# Patient Record
Sex: MALE | Race: WHITE | NOT HISPANIC OR LATINO | Employment: OTHER | URBAN - METROPOLITAN AREA
[De-identification: names, ages, dates, MRNs, and addresses within clinical notes are randomized per-mention and may not be internally consistent; named-entity substitution may affect disease eponyms.]

---

## 2017-10-10 ENCOUNTER — OFFICE VISIT (OUTPATIENT)
Dept: INTERNAL MEDICINE | Facility: CLINIC | Age: 67
End: 2017-10-10

## 2017-10-10 VITALS
SYSTOLIC BLOOD PRESSURE: 132 MMHG | DIASTOLIC BLOOD PRESSURE: 88 MMHG | WEIGHT: 197 LBS | HEART RATE: 80 BPM | BODY MASS INDEX: 30.85 KG/M2

## 2017-10-10 DIAGNOSIS — M25.50 MULTIPLE JOINT PAIN: ICD-10-CM

## 2017-10-10 DIAGNOSIS — E78.5 HYPERLIPIDEMIA, UNSPECIFIED HYPERLIPIDEMIA TYPE: ICD-10-CM

## 2017-10-10 DIAGNOSIS — R09.89 LABILE HYPERTENSION: ICD-10-CM

## 2017-10-10 DIAGNOSIS — H61.23 BILATERAL IMPACTED CERUMEN: ICD-10-CM

## 2017-10-10 DIAGNOSIS — Z00.00 HEALTH CARE MAINTENANCE: Primary | ICD-10-CM

## 2017-10-10 DIAGNOSIS — K21.9 GASTROESOPHAGEAL REFLUX DISEASE WITHOUT ESOPHAGITIS: ICD-10-CM

## 2017-10-10 DIAGNOSIS — Z23 NEED FOR PROPHYLACTIC VACCINATION AND INOCULATION AGAINST INFLUENZA: ICD-10-CM

## 2017-10-10 RX ORDER — ATORVASTATIN CALCIUM 20 MG/1
20 TABLET, FILM COATED ORAL DAILY
Qty: 90 TABLET | Refills: 3 | Status: SHIPPED | OUTPATIENT
Start: 2017-10-10

## 2017-10-10 RX ORDER — PROPRANOLOL HYDROCHLORIDE 60 MG/1
60 TABLET ORAL 2 TIMES DAILY
Qty: 180 TABLET | Refills: 3 | Status: SHIPPED | OUTPATIENT
Start: 2017-10-10

## 2017-10-10 RX ORDER — NAPROXEN 500 MG/1
500 TABLET ORAL 2 TIMES DAILY WITH MEALS
Qty: 180 TABLET | Refills: 3 | Status: SHIPPED | OUTPATIENT
Start: 2017-10-10

## 2017-10-10 ASSESSMENT — PAIN SCALES - GENERAL: PAINLEVEL: NO PAIN (0)

## 2017-10-10 NOTE — PROGRESS NOTES
[facility]   Primary Care Clinic      Resident Clinic Note        Visit Date: October 10, 2017    Jerrod Deal  MRN: [id number]  YOB: 1950    Chief Complaint: health maintenance, medication check      HPI:  Jerrod Deal is a 66 year old male with past medical history of migraine headaches, knee pain, s/p arthroplasty in 2016, GERD who comes in for routine health maintenance evaluation and medication refills.      He has no new complaints today aside from wax build up in both ears.  Has been taking his medications as directed. Typically uses Naproxen for knee pain 5/7 days and takes nexium daily.  Denies any worsening knee pain or acid reflux.  No recent fevers, chills, weight loss. Otherwise feels well.      Past medical, surgical, family history were reviewed in the chart.      ROS:  10 point ROS was reviewed and negative other than stated in HPI    Medications and Allergies were reviewed in epic and updated as indicated.        Vitals:  /88  Pulse 80  Wt 89.4 kg (197 lb)  BMI 30.85 kg/m2    Vitals:    10/10/17 0802   Weight: 89.4 kg (197 lb)       Physical Exam:  General: NAD  HEENT: Oral mucosa moist and non-erythematous, PERRLA, EOM intact. TM obscured by earwax bilaterally.    CV: RRR, normal S1S2, no m/c/r  Resp: Clear to auscultation bilaterally, no wheezes or crackles  Abd: Soft, non-tender, BS+, no masses appreciated  Extremities: WWP, no pedal edema  Neuro: AAOx3, no lateralizing symptoms or focal neurologic deficits  Psych: Normal mood and affect.      Assessment:  Jerrod Deal is a 66 year old who presents today for routine health maintenance and for medication refills.    Plan:    Jerrod was seen today for refill request.    Diagnoses and all orders for this visit:    Health care maintenance  -     FLU VACCINE, 3 YRS +, IM  [[id number]]    Migraine headaches/HTN  -     propranolol HCl 60 MG TABS; Take 1 tablet (60 mg) by mouth 2 times  daily    Gastroesophageal reflux disease without esophagitis - continue Nexium.      Multiple joint pain  -     naproxen (NAPROSYN) 500 MG tablet; Take 1 tablet (500 mg) by mouth 2 times daily (with meals)    Cerumen impaction   - Ears irrigated today in clinic with good results.      Hyperlipidemia, unspecified hyperlipidemia type  -     atorvastatin (LIPITOR) 20 MG tablet; Take 1 tablet (20 mg) by mouth daily    Health Maintenance:  Blood pressure check: Within normal limits.     Immunizations: Flu vaccine updated today.      Follow-up:  For health maintenance in 6 months to 1 year.      Patient discussed with Dr. Kahn who agrees with the assessment and plan above.      Prasanna Greene MD,   Internal Medicine PGY-3  [phone]         Teaching Physician Note:    While the patient was in clinic, I reviewed the patient's medical history and results, the resident's findings on physical examination, and the patient's diagnosis and treatment plan with the resident.  I agree with the information documented with the following exceptions: none.    Marianne Kahn M.D.  Internal Medicine  Primary Care Center   pager [phone]

## 2017-10-10 NOTE — MR AVS SNAPSHOT
After Visit Summary   10/10/2017    Jerrod Deal    MRN: [id number]           Patient Information     Date Of Birth          1950        Visit Information        Provider Department      10/10/2017 8:15 AM Prasanna Greene MD [facility]        Today's Diagnoses     Need for prophylactic vaccination and inoculation against influenza    -  1    Labile hypertension        Gastroesophageal reflux disease without esophagitis        Multiple joint pain        Hyperlipidemia, unspecified hyperlipidemia type           Follow-ups after your visit        Who to contact     Please call your clinic at [phone] to:    Ask questions about your health    Make or cancel appointments    Discuss your medicines    Learn about your test results    Speak to your doctor   If you have compliments or concerns about an experience at your clinic, or if you wish to file a complaint, please contact [facility] Patient Relations at [phone] or email us at [email or web address]         Additional Information About Your Visit        MyChart Information     M[employer]t is an electronic gateway that provides easy, online access to your medical records. With [employer], you can request a clinic appointment, read your test results, renew a prescription or communicate with your care team.     To sign up for MineWhatt visit the website at [email or web address]   You will be asked to enter the access code listed below, as well as some personal information. Please follow the directions to create your username and password.     Your access code is: [email or web address]  Expires: 2018  6:31 AM     Your access code will  in 90 days. If you need help or a new code, please contact your [facility] or call [phone] for assistance.        Care EveryWhere ID     This is your Care EveryWhere ID. This could be used by other organizations to access  your [facility] medical records  [id number]        Your Vitals Were     Pulse BMI (Body Mass Index)                80 30.85 kg/m2           Blood Pressure from Last 3 Encounters:   10/10/17 132/88   05/18/16 134/83   06/24/15 128/89    Weight from Last 3 Encounters:   10/10/17 89.4 kg (197 lb)   05/18/16 88.7 kg (195 lb 8 oz)   06/24/15 82.6 kg (182 lb)              We Performed the Following     FLU VACCINE, 3 YRS +, IM  [[id number]]          Today's Medication Changes          These changes are accurate as of: 10/10/17  8:30 AM.  If you have any questions, ask your nurse or doctor.               These medicines have changed or have updated prescriptions.        Dose/Directions    atorvastatin 20 MG tablet   Commonly known as:  LIPITOR   This may have changed:    - medication strength  - how much to take   Used for:  Hyperlipidemia, unspecified hyperlipidemia type   Changed by:  Prasanna Greene MD        Dose:  20 mg   Take 1 tablet (20 mg) by mouth daily   Quantity:  90 tablet   Refills:  3            Where to get your medicines      [facility] [id number] [place] [address] - [address] AT [facility] [address], [facility] [address]    Hours:  called pharm.  they do accept faxes Phone:  [phone]     atorvastatin 20 MG tablet    naproxen 500 MG tablet    propranolol HCl 60 MG Tabs                Primary Care Provider Office Phone # Fax #    Phani Adkins [phone]       [address] [place] [address]        Equal Access to Services     [facility] PEYMAN AH: Hadii darryl casiano hadashastrid Soemilia, waaxda luqadaha, qaybta kaalmada robert clark. So [facility] [phone].    ATENCIÓN: Si habla español, tiene a chapman disposición servicios gratuitos de asistencia lingüística. [facility] [phone].    We comply with applicable federal civil rights laws and [place]  laws. We do not discriminate on the basis of race, color, national origin, age, disability, sex, sexual orientation, or gender identity.            Thank you!     Thank you for choosing [facility]  for your care. Our goal is always to provide you with excellent care. Hearing back from our patients is one way we can continue to improve our services. Please take a few minutes to complete the written survey that you may receive in the mail after your visit with us. Thank you!             Your Updated Medication List - Protect others around you: Learn how to safely use, store and throw away your medicines at www.disposemymeds.org.          This list is accurate as of: 10/10/17  8:30 AM.  Always use your most recent med list.                   Brand Name Dispense Instructions for use Diagnosis    aspirin 81 MG tablet      Take 1 tablet by mouth daily.        atorvastatin 20 MG tablet    LIPITOR    90 tablet    Take 1 tablet (20 mg) by mouth daily    Hyperlipidemia, unspecified hyperlipidemia type       esomeprazole 20 MG CR capsule    nexIUM     Take 1 capsule (20 mg) by mouth every morning (before breakfast) Take 30-60 minutes before eating.    Gastroesophageal reflux disease without esophagitis       naproxen 500 MG tablet    NAPROSYN    180 tablet    Take 1 tablet (500 mg) by mouth 2 times daily (with meals)    Multiple joint pain       propranolol HCl 60 MG Tabs     180 tablet    Take 1 tablet (60 mg) by mouth 2 times daily    Labile hypertension

## 2017-10-10 NOTE — NURSING NOTE
Patient tolerated ear wash well.  Large amount of impacted cerumen removed from both ear/ears.  Tympanic membrane visible.  Cristiana Contreras LPN at 10:38 AM on 10/10/2017.

## 2017-10-10 NOTE — NURSING NOTE
"Chief Complaint   Patient presents with     Refill Request     Patient here for medication refills.     Cristiana PortilloJOSEMANUEL lara at 8:02 AM on 10/10/2017.    Injectable Influenza Immunization Documentation    1.  Has the patient received the information for the injectable influenza vaccine? YES     2. Is the patient 6 months of age or older? YES     3. Does the patient have any of the following contraindications?         Severe allergy to eggs? No     Severe allergic reaction to previous influenza vaccines? No   Severe allergy to latex? No       History of Guillain-Palmyra syndrome? No     Currently have a temperature greater than 100.4F? No        4.  Severely egg allergic patients should have flu vaccine eligibility assessed by an MD, RN, or pharmacist, and those who received flu vaccine should be observed for 15 min by an MD, RN, Pharmacist, Medical Technician, or member of clinic staff.\": YES    5. Latex-allergic patients should be given latex-free influenza vaccine N/A. Please reference the Vaccine latex table to determine if your clinic s product is latex-containing.               "

## 2018-07-30 ENCOUNTER — TRANSFERRED RECORDS (OUTPATIENT)
Dept: HEALTH INFORMATION MANAGEMENT | Facility: CLINIC | Age: 68
End: 2018-07-30

## 2019-03-14 DIAGNOSIS — M25.50 MULTIPLE JOINT PAIN: ICD-10-CM

## 2019-03-14 RX ORDER — NAPROXEN 500 MG/1
500 TABLET ORAL 2 TIMES DAILY WITH MEALS
Qty: 180 TABLET | Refills: 3 | Status: CANCELLED | OUTPATIENT
Start: 2019-03-14

## 2019-03-15 NOTE — TELEPHONE ENCOUNTER
Patient has not been seen here since 2014 - refill refused and pharmacy notified.      Kathleen M Doege RN

## 2024-09-28 ENCOUNTER — APPOINTMENT (OUTPATIENT)
Dept: RADIOLOGY | Facility: CLINIC | Age: 74
End: 2024-09-28
Attending: EMERGENCY MEDICINE
Payer: COMMERCIAL

## 2024-09-28 ENCOUNTER — HOSPITAL ENCOUNTER (EMERGENCY)
Facility: CLINIC | Age: 74
Discharge: HOME | End: 2024-09-28
Attending: EMERGENCY MEDICINE | Admitting: EMERGENCY MEDICINE
Payer: COMMERCIAL

## 2024-09-28 VITALS
WEIGHT: 195 LBS | RESPIRATION RATE: 19 BRPM | HEART RATE: 87 BPM | SYSTOLIC BLOOD PRESSURE: 123 MMHG | DIASTOLIC BLOOD PRESSURE: 92 MMHG | BODY MASS INDEX: 28.88 KG/M2 | TEMPERATURE: 97.8 F | OXYGEN SATURATION: 92 % | HEIGHT: 69 IN

## 2024-09-28 DIAGNOSIS — S22.42XA CLOSED FRACTURE OF MULTIPLE RIBS OF LEFT SIDE, INITIAL ENCOUNTER: ICD-10-CM
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
## 2024-09-28 PROCEDURE — 99284 EMERGENCY DEPT VISIT MOD MDM: CPT | Mod: 25 | Performed by: EMERGENCY MEDICINE

## 2024-09-28 PROCEDURE — 71101 X-RAY EXAM UNILAT RIBS/CHEST: CPT | Mod: LT

## 2024-09-28 PROCEDURE — 72100 X-RAY EXAM L-S SPINE 2/3 VWS: CPT

## 2024-09-28 PROCEDURE — 96372 THER/PROPH/DIAG INJ SC/IM: CPT | Performed by: EMERGENCY MEDICINE

## 2024-09-28 PROCEDURE — 250N000013 HC RX MED GY IP 250 OP 250 PS 637: Performed by: EMERGENCY MEDICINE

## 2024-09-28 PROCEDURE — 73502 X-RAY EXAM HIP UNI 2-3 VIEWS: CPT

## 2024-09-28 PROCEDURE — 250N000011 HC RX IP 250 OP 636: Performed by: EMERGENCY MEDICINE

## 2024-09-28 RX ORDER — OXYCODONE HYDROCHLORIDE 5 MG/1
5 TABLET ORAL ONCE
Status: COMPLETED | OUTPATIENT
Start: 2024-09-28 | End: 2024-09-28

## 2024-09-28 RX ORDER — LIDOCAINE 4 G/G
1 PATCH TOPICAL ONCE
Status: DISCONTINUED | OUTPATIENT
Start: 2024-09-28 | End: 2024-09-28 | Stop reason: HOSPADM

## 2024-09-28 RX ORDER — LIDOCAINE 50 MG/G
1 PATCH TOPICAL EVERY 24 HOURS
Qty: 10 PATCH | Refills: 0 | Status: SHIPPED | OUTPATIENT
Start: 2024-09-28 | End: 2024-10-08

## 2024-09-28 RX ORDER — OXYCODONE HYDROCHLORIDE 5 MG/1
5-10 TABLET ORAL EVERY 6 HOURS PRN
Qty: 15 TABLET | Refills: 0 | Status: SHIPPED | OUTPATIENT
Start: 2024-09-28 | End: 2024-10-01

## 2024-09-28 RX ADMIN — HYDROMORPHONE HYDROCHLORIDE 1 MG: 1 INJECTION, SOLUTION INTRAMUSCULAR; INTRAVENOUS; SUBCUTANEOUS at 12:18

## 2024-09-28 RX ADMIN — OXYCODONE 5 MG: 5 TABLET ORAL at 11:52

## 2024-09-28 RX ADMIN — OXYCODONE 5 MG: 5 TABLET ORAL at 14:32

## 2024-09-28 RX ADMIN — LIDOCAINE 1 PATCH: 4 PATCH TOPICAL at 12:41

## 2024-09-28 ASSESSMENT — ACTIVITIES OF DAILY LIVING (ADL)
ADLS_ACUITY_SCORE: 35

## 2024-09-28 ASSESSMENT — COLUMBIA-SUICIDE SEVERITY RATING SCALE - C-SSRS
2. HAVE YOU ACTUALLY HAD ANY THOUGHTS OF KILLING YOURSELF IN THE PAST MONTH?: NO
1. IN THE PAST MONTH, HAVE YOU WISHED YOU WERE DEAD OR WISHED YOU COULD GO TO SLEEP AND NOT WAKE UP?: NO
6. HAVE YOU EVER DONE ANYTHING, STARTED TO DO ANYTHING, OR PREPARED TO DO ANYTHING TO END YOUR LIFE?: NO

## 2024-09-28 NOTE — DISCHARGE INSTRUCTIONS
Alternate tylenol and naproxen for pain.  Use lidoderm patches daily and take oxycodone as directed/needed for breakthrough pain. Use incentive spirometer to help with breathing, prevent secondary infection/pneumonia.  Follow up closely with primary clinic for further pain management and re-evaluation. Return to the ER for breathing difficulty, fevers, uncontrolled pain.

## 2024-09-28 NOTE — ED PROVIDER NOTES
Emergency Department Encounter     Evaluation Date & Time:   9/28/2024 11:25 AM    CHIEF COMPLAINT:  Back Pain and Fall      Triage Note:Arrives after he fell backwards off a retaining wall that was about 4ft tall. Did hit the back of his head slightly but having a lot of pain on the left middle back/ribs. Denies thinners. Denies LOC.         ED COURSE & MEDICAL DECISION MAKING:     Pt here for evaluation after he fell from retaining wall at home last evening while making a fire. Reportedly stepped backwards off wall that was roughly 4-5 feet up and landed on back.  Pt denies any sort of significant head injury, LOC, or anticoagulation use. He's having pain to left back/side, primarily along ribs with increased pain on breathing/movement.  Pt also having some left hip pain, but ambulatory and neuro intact. No abdominal pain, no neck pain/tenderness.  Pt has no obvious crepitus on exam.  Highly suspicious for rib fracture(s). Will get xrays, treat symptomatically and reassess.  Pt has no hypoxia or hemodynamic instability.      ED Course as of 09/28/24 1422   Sat Sep 28, 2024   1141 11:41 AM I introduced myself to the patient, obtained patient history, performed a physical exam, and discussed plan for ED workup including potential diagnostic laboratory/imaging studies and interventions.   1212 Pt unable to lay flat for some of xrays due to pain, IM dilaudid ordered.     1242 Getting better pain relief with IM dilaudid.   1359 Left hip and pelvis xrays (independent interp): no acute fracture/dislocation    Left ribs and chest xrays show no PTX, but nondisplaced left 6-8 rib fractures.      Lumbar spine reviewed and possible minor wedge deformity of T11/T12.  Pt has no real pain/tenderness along this area.  Suspect old/chronic.   1420 Discussed with pt and family results.  Pt remains without hypoxia, pain improved here.  Pt does not want to stay in hospital, agreeable to discharge, close outpt follow up. Rx for  lidoderm patches, oxycodone, incentive spirometer sent home with pt.  Pt advised on using incentive spirometer, return precautions.    Pt certainly at risk for bounce back, but would like to try and manage pain/symptoms at home.         Medical Decision Making    History:  Supplemental history from: Family Member/Significant Other  External Record(s) reviewed: Outpatient Record: Primary Care Visit 11/14/23    Work Up:  Chart documentation includes differential considered and any EKGs or imaging independently interpreted by provider, where specified.  In additional to work up documented, I considered the following work up: Documented in chart, if applicable.    External consultation:  Discussion of management with another provider: Documented in chart, if applicable    Complicating factors:  Care impacted by chronic illness: Hyperlipidemia and Hypertension  Care affected by social determinants of health: N/A    Disposition considerations: Discharge. I prescribed additional prescription strength medication(s) as charted. I considered admission, but ultimately discharged patient after shared decision making/discussion.  .    Not Applicable     At the conclusion of the encounter I discussed the results of all the tests and the disposition. The questions were answered. The patient or family acknowledged understanding and was agreeable with the care plan.      MEDICATIONS GIVEN IN THE EMERGENCY DEPARTMENT:  Medications   Lidocaine (LIDOCARE) 4 % Patch 1 patch (1 patch Transdermal $Patch/Med Applied 9/28/24 1241)   oxyCODONE (ROXICODONE) tablet 5 mg (has no administration in time range)   oxyCODONE (ROXICODONE) tablet 5 mg (5 mg Oral $Given 9/28/24 1152)   HYDROmorphone (DILAUDID) injection 1 mg (1 mg Intramuscular $Given 9/28/24 1218)       NEW PRESCRIPTIONS STARTED AT TODAY'S ED VISIT:  New Prescriptions    LIDOCAINE (LIDODERM) 5 % PATCH    Place 1 patch over 12 hours onto the skin every 24 hours for 10 days. Apply to  area of pain    OXYCODONE (ROXICODONE) 5 MG TABLET    Take 1-2 tablets (5-10 mg) by mouth every 6 hours as needed for moderate to severe pain or breakthrough pain.       HPI   The history is provided by the patient and the spouse. No  was used.        Jerrod Deal is a 73 year old male with a pertinent history of HTN and HLD who presents to this ED by private car with his wife for evaluation of back pain after a fall.    The patient reports last night he accidentally fell backwards off a retaining wall. He fell about 4.5-5 feet before landing on his back on gravel. He reports hitting his head slightly but denies any loss of consciousness. He is not anticoagulated. Since the fall he has had ongoing left sided back pain that extends down into the left hip. The pain is worse with any sort of movement and deep breathing. He reports sleeping in the car last night as the adjustable seat allowed him to find some comfort and assist him with moving. He took naproxen for pain at home. He has been able to walk but moves very slowly.    REVIEW OF SYSTEMS:  See HPI      Medical History     Past Medical History:   Diagnosis Date    GERD (gastroesophageal reflux disease) 4/22/2011    Hyperlipidemia     Labile hypertension     Migraine headaches 4/22/2011    Rotator cuff strain        Past Surgical History:   Procedure Laterality Date    ARTHROSCOPY KNEE      ROTATOR CUFF REPAIR RT/LT Left 11/2014    SEPTOPLASTY         Family History   Problem Relation Age of Onset    C.A.D. Unknown     Diabetes Unknown     Prostate Cancer Unknown        Social History     Tobacco Use    Smoking status: Every Day     Current packs/day: 0.20     Average packs/day: 0.2 packs/day for 20.0 years (4.0 ttl pk-yrs)     Types: Cigarettes    Smokeless tobacco: Never   Substance Use Topics    Alcohol use: No     Alcohol/week: 0.0 standard drinks of alcohol    Drug use: No       lidocaine (LIDODERM) 5 % patch  oxyCODONE (ROXICODONE) 5  "MG tablet  aspirin 81 MG tablet  atorvastatin (LIPITOR) 20 MG tablet  esomeprazole (NEXIUM) 20 MG CR capsule  naproxen (NAPROSYN) 500 MG tablet  propranolol HCl 60 MG TABS        Physical Exam     Vitals:  BP (!) 143/97   Pulse 83   Temp 97.8  F (36.6  C) (Oral)   Resp 19   Ht 1.753 m (5' 9\")   Wt 88.5 kg (195 lb)   SpO2 94%   BMI 28.80 kg/m      PHYSICAL EXAM:   Physical Exam  Vitals and nursing note reviewed.   Constitutional:       General: He is not in acute distress.     Comments: Uncomfortable appearing.   HENT:      Head: Normocephalic and atraumatic.      Nose: Nose normal.      Mouth/Throat:      Mouth: Mucous membranes are moist.   Eyes:      Pupils: Pupils are equal, round, and reactive to light.   Cardiovascular:      Rate and Rhythm: Normal rate and regular rhythm.      Pulses: Normal pulses.           Radial pulses are 2+ on the right side and 2+ on the left side.        Dorsalis pedis pulses are 2+ on the right side and 2+ on the left side.   Pulmonary:      Effort: Pulmonary effort is normal. No respiratory distress.      Breath sounds: Normal breath sounds.   Abdominal:      Palpations: Abdomen is soft.      Tenderness: There is no abdominal tenderness.   Musculoskeletal:      Cervical back: Full passive range of motion without pain, normal range of motion and neck supple.      Comments: No calf tenderness or swelling b/left. Tender posterior and lateral ribs on the left. No crepitus. Bruising to the left low back. No midline spinal tenderness. Pain with any movement.   Skin:     General: Skin is warm.      Findings: No rash.   Neurological:      General: No focal deficit present.      Mental Status: He is alert. Mental status is at baseline.      Comments: Fluent speech, no acute lateralizing deficits   Psychiatric:         Mood and Affect: Mood normal.         Behavior: Behavior normal.       Results     LAB:  All pertinent labs reviewed and interpreted  Labs Ordered and Resulted from Time " of ED Arrival to Time of ED Departure - No data to display    RADIOLOGY:  XR Pelvis and Hip Left 2 Views   Final Result   IMPRESSION: No acute fracture or subluxation. Mild bilateral hip osteoarthritis. Vascular calcifications.      XR Lumbar Spine 2/3 Views   Final Result   IMPRESSION: 5 nonrib-bearing lumbar type vertebrae. Rightward curvature to the lower thoracic and upper lumbar spine. Exaggerated lumbar lordosis. 6 mm retrolisthesis of L1 on L2 and L2 on L3. Minimal retrolisthesis of L5 on S1. Mild anterior wedging of    the T11 and T12 vertebral bodies. Is reflect age indeterminant compression fractures, favored to be chronic in the absence of pain on palpation. Multilevel moderate degenerative disc disease in the lower thoracic spine with moderate T12-L1 and L1-L2    degenerative disc disease. Moderate L5-S1 degenerative disc disease with mild degenerative disc disease elsewhere. Moderate L2-L3 to L5-S1 facet arthropathy. 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 Degenerative change at    the bilateral sacroiliac joints. Scattered vascular calcification.      Ribs XR, unilat 3 views + PA chest,  left   Final Result   IMPRESSION: The visualized heart and lungs are negative. Nondisplaced fractures of the left sixth, seventh, and eighth ribs laterally.                   ECG:  none    PROCEDURES:  Procedures:  none      FINAL IMPRESSION:    ICD-10-CM    1. Closed fracture of multiple ribs of left side, initial encounter  S22.42XA       2. Ground-level fall  W18.30XA           0 minutes of critical care time      I, Shawn Doty, am serving as a scribe to document services personally performed by Dr. Vitaliy Padron, based on my observations and the provider's statements to me. I, Vitaliy Padron, DO attest that Shawn Doty is acting in a scribe capacity, has observed my performance of the services and has documented them in accordance with my direction.      Vitaliy Padron,  DO  Emergency Medicine  [facility] EMERGENCY ROOM  9/28/2024  11:40 AM        Vitaliy Padron MD  09/28/24 1428

## 2024-09-28 NOTE — ED NOTES
Reviewed discharge instructions with pt and wife. Answered all questions. Return demonstration on how to use an IS.

## 2024-09-28 NOTE — ED TRIAGE NOTES
Arrives after he fell backwards off a retaining wall that was about 4ft tall. Did hit the back of his head slightly but having a lot of pain on the left middle back/ribs. Denies thinners. Denies LOC.      Triage Assessment (Adult)       Row Name 09/28/24 1133          Triage Assessment    Airway WDL WDL        Respiratory WDL    Respiratory WDL WDL        Skin Circulation/Temperature WDL    Skin Circulation/Temperature WDL WDL        Cardiac WDL    Cardiac WDL WDL        Peripheral/Neurovascular WDL    Peripheral Neurovascular WDL WDL